# Patient Record
Sex: MALE | Race: WHITE | NOT HISPANIC OR LATINO | URBAN - METROPOLITAN AREA
[De-identification: names, ages, dates, MRNs, and addresses within clinical notes are randomized per-mention and may not be internally consistent; named-entity substitution may affect disease eponyms.]

---

## 2022-10-25 ENCOUNTER — APPOINTMENT (OUTPATIENT)
Age: 52
Setting detail: DERMATOLOGY
End: 2022-10-25

## 2022-10-25 DIAGNOSIS — Z80.8 FAMILY HISTORY OF MALIGNANT NEOPLASM OF OTHER ORGANS OR SYSTEMS: ICD-10-CM

## 2022-10-25 PROBLEM — D48.5 NEOPLASM OF UNCERTAIN BEHAVIOR OF SKIN: Status: ACTIVE | Noted: 2022-10-25

## 2022-10-25 PROCEDURE — OTHER REASSURANCE: OTHER

## 2022-10-25 PROCEDURE — OTHER MIPS QUALITY: OTHER

## 2022-10-25 PROCEDURE — OTHER COUNSELING: OTHER

## 2022-10-25 PROCEDURE — 99202 OFFICE O/P NEW SF 15 MIN: CPT | Mod: 25

## 2022-10-25 PROCEDURE — OTHER BIOPSY BY PUNCH METHOD: OTHER

## 2022-10-25 PROCEDURE — 11104 PUNCH BX SKIN SINGLE LESION: CPT

## 2022-10-25 ASSESSMENT — LOCATION ZONE DERM: LOCATION ZONE: NOSE

## 2022-10-25 ASSESSMENT — LOCATION DETAILED DESCRIPTION DERM: LOCATION DETAILED: RIGHT NASAL SIDEWALL

## 2022-10-25 ASSESSMENT — LOCATION SIMPLE DESCRIPTION DERM: LOCATION SIMPLE: RIGHT NOSE

## 2022-10-25 NOTE — PROCEDURE: BIOPSY BY PUNCH METHOD
Validate Triangulation: No
Epidermal Sutures: 4-0 Ethilon
Information: Selecting Yes will display possible errors in your note based on the variables you have selected. This validation is only offered as a suggestion for you. PLEASE NOTE THAT THE VALIDATION TEXT WILL BE REMOVED WHEN YOU FINALIZE YOUR NOTE. IF YOU WANT TO FAX A PRELIMINARY NOTE YOU WILL NEED TO TOGGLE THIS TO 'NO' IF YOU DO NOT WANT IT IN YOUR FAXED NOTE.
Hemostasis: None
Dressing: bandage
Suture Removal: 14 days
Additional Anesthesia Volume In Cc (Will Not Render If 0): 0
Anesthesia Type: 1% lidocaine with epinephrine
Wound Care: Petrolatum
Billing Type: Third-Party Bill
Home Suture Removal Text: Patient was provided a home suture removal kit and will remove their sutures at home.  If they have any questions or difficulties they will call the office.
Detail Level: Detailed
Biopsy Type: H and E
Punch Size In Mm: 4
Was A Bandage Applied: Yes
Consent: Written consent was obtained and risks were reviewed including but not limited to scarring, infection, bleeding, scabbing, incomplete removal, nerve damage and allergy to anesthesia.
Post-Care Instructions: I reviewed with the patient in detail post-care instructions. Patient is to keep the biopsy site dry overnight, and then apply bacitracin twice daily until healed. Patient may apply hydrogen peroxide soaks to remove any crusting.
Anesthesia Volume In Cc (Will Not Render If 0): 0.5
Notification Instructions: Patient will be notified of biopsy results. However, patient instructed to call the office if not contacted within 2 weeks.

## 2022-10-25 NOTE — PROCEDURE: MIPS QUALITY
Quality 110: Preventive Care And Screening: Influenza Immunization: Influenza Immunization not Administered because Patient Refused.
Detail Level: Detailed
Quality 431: Preventive Care And Screening: Unhealthy Alcohol Use - Screening: Patient not identified as an unhealthy alcohol user when screened for unhealthy alcohol use using a systematic screening method
Quality 130: Documentation Of Current Medications In The Medical Record: Current Medications Documented
Quality 402: Tobacco Use And Help With Quitting Among Adolescents: Patient screened for tobacco and is an ex-smoker

## 2022-11-08 ENCOUNTER — APPOINTMENT (OUTPATIENT)
Age: 52
Setting detail: DERMATOLOGY
End: 2022-11-08

## 2022-11-08 DIAGNOSIS — Z48.02 ENCOUNTER FOR REMOVAL OF SUTURES: ICD-10-CM

## 2022-11-08 PROBLEM — C44.311 BASAL CELL CARCINOMA OF SKIN OF NOSE: Status: ACTIVE | Noted: 2022-11-08

## 2022-11-08 PROCEDURE — OTHER REFERRAL: OTHER

## 2022-11-08 PROCEDURE — 99213 OFFICE O/P EST LOW 20 MIN: CPT

## 2022-11-08 PROCEDURE — OTHER REASSURANCE: OTHER

## 2022-11-08 PROCEDURE — OTHER COUNSELING: OTHER

## 2022-11-08 PROCEDURE — OTHER SUTURE REMOVAL (NO GLOBAL PERIOD): OTHER

## 2022-11-08 PROCEDURE — OTHER CONSULTATION FOR MOHS SURGERY: OTHER

## 2022-11-08 ASSESSMENT — LOCATION DETAILED DESCRIPTION DERM: LOCATION DETAILED: RIGHT NASAL SIDEWALL

## 2022-11-08 ASSESSMENT — LOCATION ZONE DERM: LOCATION ZONE: NOSE

## 2022-11-08 ASSESSMENT — LOCATION SIMPLE DESCRIPTION DERM: LOCATION SIMPLE: RIGHT NOSE

## 2023-02-08 ENCOUNTER — APPOINTMENT (OUTPATIENT)
Dept: URBAN - METROPOLITAN AREA SURGERY 21 | Age: 53
Setting detail: DERMATOLOGY
End: 2023-02-08

## 2023-02-08 PROBLEM — C44.311 BASAL CELL CARCINOMA OF SKIN OF NOSE: Status: ACTIVE | Noted: 2023-02-08

## 2023-02-08 PROCEDURE — 17311 MOHS 1 STAGE H/N/HF/G: CPT

## 2023-02-08 PROCEDURE — 17312 MOHS ADDL STAGE: CPT

## 2023-02-08 PROCEDURE — OTHER MOHS SURGERY: OTHER

## 2023-03-09 ENCOUNTER — APPOINTMENT (OUTPATIENT)
Dept: URBAN - METROPOLITAN AREA SURGERY 21 | Age: 53
Setting detail: DERMATOLOGY
End: 2023-03-09

## 2023-03-09 DIAGNOSIS — Z48.817 ENCOUNTER FOR SURGICAL AFTERCARE FOLLOWING SURGERY ON THE SKIN AND SUBCUTANEOUS TISSUE: ICD-10-CM

## 2023-03-09 DIAGNOSIS — Z85.828 PERSONAL HISTORY OF OTHER MALIGNANT NEOPLASM OF SKIN: ICD-10-CM

## 2023-03-09 PROCEDURE — OTHER PRESCRIPTION: OTHER

## 2023-03-09 PROCEDURE — OTHER COUNSELING: OTHER

## 2023-03-09 PROCEDURE — 99212 OFFICE O/P EST SF 10 MIN: CPT

## 2023-03-09 RX ORDER — MUPIROCIN 20 MG/G
OINTMENT TOPICAL BID
Qty: 22 | Refills: 1 | Status: ERX | COMMUNITY
Start: 2023-03-09

## 2023-03-09 ASSESSMENT — LOCATION SIMPLE DESCRIPTION DERM
LOCATION SIMPLE: RIGHT NOSE
LOCATION SIMPLE: RIGHT NOSE

## 2023-03-09 ASSESSMENT — LOCATION ZONE DERM
LOCATION ZONE: NOSE
LOCATION ZONE: NOSE

## 2023-03-09 ASSESSMENT — LOCATION DETAILED DESCRIPTION DERM
LOCATION DETAILED: RIGHT NASAL ALA
LOCATION DETAILED: RIGHT NASAL SIDEWALL

## 2023-05-15 ENCOUNTER — APPOINTMENT (OUTPATIENT)
Dept: URBAN - METROPOLITAN AREA SURGERY 21 | Age: 53
Setting detail: DERMATOLOGY
End: 2023-05-15

## 2023-05-15 DIAGNOSIS — L82.1 OTHER SEBORRHEIC KERATOSIS: ICD-10-CM

## 2023-05-15 DIAGNOSIS — D49.2 NEOPLASM OF UNSPECIFIED BEHAVIOR OF BONE, SOFT TISSUE, AND SKIN: ICD-10-CM

## 2023-05-15 PROCEDURE — OTHER BIOPSY BY SHAVE METHOD: OTHER

## 2023-05-15 PROCEDURE — 99212 OFFICE O/P EST SF 10 MIN: CPT | Mod: 25

## 2023-05-15 PROCEDURE — OTHER COUNSELING: OTHER

## 2023-05-15 PROCEDURE — OTHER REASSURANCE: OTHER

## 2023-05-15 PROCEDURE — 11102 TANGNTL BX SKIN SINGLE LES: CPT

## 2023-05-15 ASSESSMENT — LOCATION ZONE DERM
LOCATION ZONE: SCALP
LOCATION ZONE: FACE

## 2023-05-15 ASSESSMENT — LOCATION SIMPLE DESCRIPTION DERM
LOCATION SIMPLE: POSTERIOR SCALP
LOCATION SIMPLE: LEFT TEMPLE

## 2023-05-15 ASSESSMENT — LOCATION DETAILED DESCRIPTION DERM
LOCATION DETAILED: LEFT INFERIOR TEMPLE
LOCATION DETAILED: LEFT SUPERIOR OCCIPITAL SCALP

## 2023-05-15 NOTE — PROCEDURE: BIOPSY BY SHAVE METHOD
